# Patient Record
Sex: MALE | Race: BLACK OR AFRICAN AMERICAN
[De-identification: names, ages, dates, MRNs, and addresses within clinical notes are randomized per-mention and may not be internally consistent; named-entity substitution may affect disease eponyms.]

---

## 2021-02-08 ENCOUNTER — HOSPITAL ENCOUNTER (EMERGENCY)
Dept: HOSPITAL 57 - BURERS | Age: 41
Discharge: LEFT BEFORE DISCHARGE | End: 2021-02-08
Payer: SELF-PAY

## 2021-02-08 DIAGNOSIS — F17.210: ICD-10-CM

## 2021-02-08 DIAGNOSIS — R77.8: ICD-10-CM

## 2021-02-08 DIAGNOSIS — I10: Primary | ICD-10-CM

## 2021-02-08 LAB
ALBUMIN SERPL BCG-MCNC: 4.4 G/DL (ref 3.5–5)
ALP SERPL-CCNC: 66 U/L (ref 40–110)
ALT SERPL W P-5'-P-CCNC: 17 U/L (ref 8–55)
ANION GAP SERPL CALC-SCNC: 24 MMOL/L (ref 10–20)
ANISOCYTOSIS BLD QL SMEAR: (no result) (100X)
AST SERPL-CCNC: 13 U/L (ref 5–34)
BASOPHILS # BLD AUTO: 0.1 THOU/UL (ref 0–0.2)
BASOPHILS NFR BLD AUTO: 1.3 % (ref 0–1)
BILIRUB SERPL-MCNC: 0.6 MG/DL (ref 0.2–1.2)
BUN SERPL-MCNC: 74 MG/DL (ref 8.9–20.6)
CALCIUM SERPL-MCNC: 9.9 MG/DL (ref 7.8–10.44)
CHLORIDE SERPL-SCNC: 98 MMOL/L (ref 98–107)
CK MB SERPL-MCNC: 5.4 NG/ML (ref 0–6.6)
CO2 SERPL-SCNC: 26 MMOL/L (ref 22–29)
CREAT CL PREDICTED SERPL C-G-VRATE: 0 ML/MIN (ref 70–130)
EOSINOPHIL # BLD AUTO: 0.7 THOU/UL (ref 0–0.7)
EOSINOPHIL NFR BLD AUTO: 8.8 % (ref 0–10)
GLOBULIN SER CALC-MCNC: 4 G/DL (ref 2.4–3.5)
GLUCOSE SERPL-MCNC: 83 MG/DL (ref 70–105)
HGB BLD-MCNC: 9.7 G/DL (ref 14–18)
LYMPHOCYTES # BLD AUTO: 0.9 THOU/UL (ref 1.2–3.4)
LYMPHOCYTES NFR BLD AUTO: 11.2 % (ref 21–51)
MCH RBC QN AUTO: 28.9 PG (ref 27–31)
MCV RBC AUTO: 96.3 FL (ref 78–98)
MDIFF COMPLETE?: YES
MONOCYTES # BLD AUTO: 0.4 THOU/UL (ref 0.11–0.59)
MONOCYTES NFR BLD AUTO: 5.2 % (ref 0–10)
NEUTROPHILS # BLD AUTO: 6.1 THOU/UL (ref 1.4–6.5)
NEUTROPHILS NFR BLD AUTO: 73.5 % (ref 42–75)
OVALOCYTES BLD QL SMEAR: (no result) (100X)
PLATELET # BLD AUTO: 187 THOU/UL (ref 130–400)
POTASSIUM SERPL-SCNC: 5 MMOL/L (ref 3.5–5.1)
RBC # BLD AUTO: 3.36 MILL/UL (ref 4.7–6.1)
SODIUM SERPL-SCNC: 143 MMOL/L (ref 136–145)
WBC # BLD AUTO: 8.3 THOU/UL (ref 4.8–10.8)

## 2021-02-08 PROCEDURE — 71045 X-RAY EXAM CHEST 1 VIEW: CPT

## 2021-02-08 PROCEDURE — 85025 COMPLETE CBC W/AUTO DIFF WBC: CPT

## 2021-02-08 PROCEDURE — 82553 CREATINE MB FRACTION: CPT

## 2021-02-08 PROCEDURE — 94760 N-INVAS EAR/PLS OXIMETRY 1: CPT

## 2021-02-08 PROCEDURE — 96374 THER/PROPH/DIAG INJ IV PUSH: CPT

## 2021-02-08 PROCEDURE — 84484 ASSAY OF TROPONIN QUANT: CPT

## 2021-02-08 PROCEDURE — 93005 ELECTROCARDIOGRAM TRACING: CPT

## 2021-02-08 PROCEDURE — 80053 COMPREHEN METABOLIC PANEL: CPT

## 2021-02-08 NOTE — RAD
PORTABLE CHEST: 

 

Date: 2-8-2021

 

An AP portable film at 1539 is compared with an 11-21-18 study. 

 

FINDINGS: 

The heart is mildly enlarged, perhaps a little more so than before. Nevertheless, there are no conges
tive findings or large pleural effusions. Slight blunting of the left costophrenic angle may likely b
e related to positioning of the patient. There is no vascular congestion or edema. Slight haziness ov
er the right lower chest is felt to be due to positioning as well. 

 

IMPRESSION: 

Mild cardiomegaly but no acute findings otherwise. 

 

POS: HOME

## 2021-06-18 ENCOUNTER — HOSPITAL ENCOUNTER (INPATIENT)
Dept: HOSPITAL 92 - ERS | Age: 41
LOS: 4 days | Discharge: LEFT BEFORE DISCHARGE | DRG: 280 | End: 2021-06-22
Attending: INTERNAL MEDICINE | Admitting: INTERNAL MEDICINE
Payer: MEDICARE

## 2021-06-18 VITALS — BODY MASS INDEX: 24.2 KG/M2

## 2021-06-18 DIAGNOSIS — K82.8: ICD-10-CM

## 2021-06-18 DIAGNOSIS — N18.6: ICD-10-CM

## 2021-06-18 DIAGNOSIS — I50.32: ICD-10-CM

## 2021-06-18 DIAGNOSIS — E87.6: ICD-10-CM

## 2021-06-18 DIAGNOSIS — Z20.822: ICD-10-CM

## 2021-06-18 DIAGNOSIS — K59.09: ICD-10-CM

## 2021-06-18 DIAGNOSIS — I13.2: ICD-10-CM

## 2021-06-18 DIAGNOSIS — I21.A1: ICD-10-CM

## 2021-06-18 DIAGNOSIS — D63.1: ICD-10-CM

## 2021-06-18 DIAGNOSIS — Z99.2: ICD-10-CM

## 2021-06-18 DIAGNOSIS — M47.816: ICD-10-CM

## 2021-06-18 DIAGNOSIS — Z79.899: ICD-10-CM

## 2021-06-18 DIAGNOSIS — I70.1: ICD-10-CM

## 2021-06-18 DIAGNOSIS — I31.3: ICD-10-CM

## 2021-06-18 DIAGNOSIS — I16.0: Primary | ICD-10-CM

## 2021-06-18 DIAGNOSIS — F17.210: ICD-10-CM

## 2021-06-18 DIAGNOSIS — E85.9: ICD-10-CM

## 2021-06-18 LAB
ALBUMIN SERPL BCG-MCNC: 4 G/DL (ref 3.5–5)
ALP SERPL-CCNC: 51 U/L (ref 40–110)
ALT SERPL W P-5'-P-CCNC: (no result) U/L (ref 8–55)
ANION GAP SERPL CALC-SCNC: 14 MMOL/L (ref 10–20)
AST SERPL-CCNC: 9 U/L (ref 5–34)
BASOPHILS # BLD AUTO: 0.1 THOU/UL (ref 0–0.2)
BASOPHILS NFR BLD AUTO: 1.1 % (ref 0–1)
BILIRUB SERPL-MCNC: 0.7 MG/DL (ref 0.2–1.2)
BUN SERPL-MCNC: 15 MG/DL (ref 8.9–20.6)
CALCIUM SERPL-MCNC: 9.9 MG/DL (ref 7.8–10.44)
CHLORIDE SERPL-SCNC: 96 MMOL/L (ref 98–107)
CK MB SERPL-MCNC: 2.2 NG/ML (ref 0–6.6)
CK SERPL-CCNC: 125 U/L (ref 30–200)
CO2 SERPL-SCNC: 32 MMOL/L (ref 22–29)
CREAT CL PREDICTED SERPL C-G-VRATE: 0 ML/MIN (ref 70–130)
EOSINOPHIL # BLD AUTO: 0.5 THOU/UL (ref 0–0.7)
EOSINOPHIL NFR BLD AUTO: 6.3 % (ref 0–10)
GLOBULIN SER CALC-MCNC: 3.6 G/DL (ref 2.4–3.5)
GLUCOSE SERPL-MCNC: 92 MG/DL (ref 70–105)
HGB BLD-MCNC: 12.4 G/DL (ref 14–18)
LIPASE SERPL-CCNC: 25 U/L (ref 8–78)
LYMPHOCYTES # BLD: 0.9 THOU/UL (ref 1.2–3.4)
LYMPHOCYTES NFR BLD AUTO: 12 % (ref 21–51)
MCH RBC QN AUTO: 28.1 PG (ref 27–31)
MCV RBC AUTO: 86.8 FL (ref 78–98)
MONOCYTES # BLD AUTO: 0.7 THOU/UL (ref 0.11–0.59)
MONOCYTES NFR BLD AUTO: 8.7 % (ref 0–10)
NEUTROPHILS # BLD AUTO: 5.5 THOU/UL (ref 1.4–6.5)
NEUTROPHILS NFR BLD AUTO: 71.9 % (ref 42–75)
PLATELET # BLD AUTO: 188 THOU/UL (ref 130–400)
POTASSIUM SERPL-SCNC: 3.3 MMOL/L (ref 3.5–5.1)
RBC # BLD AUTO: 4.42 MILL/UL (ref 4.7–6.1)
SODIUM SERPL-SCNC: 139 MMOL/L (ref 136–145)
WBC # BLD AUTO: 7.7 THOU/UL (ref 4.8–10.8)

## 2021-06-18 PROCEDURE — 85025 COMPLETE CBC W/AUTO DIFF WBC: CPT

## 2021-06-18 PROCEDURE — 80053 COMPREHEN METABOLIC PANEL: CPT

## 2021-06-18 PROCEDURE — 71045 X-RAY EXAM CHEST 1 VIEW: CPT

## 2021-06-18 PROCEDURE — U0005 INFEC AGEN DETEC AMPLI PROBE: HCPCS

## 2021-06-18 PROCEDURE — 82553 CREATINE MB FRACTION: CPT

## 2021-06-18 PROCEDURE — 80048 BASIC METABOLIC PNL TOTAL CA: CPT

## 2021-06-18 PROCEDURE — 71275 CT ANGIOGRAPHY CHEST: CPT

## 2021-06-18 PROCEDURE — 96374 THER/PROPH/DIAG INJ IV PUSH: CPT

## 2021-06-18 PROCEDURE — 86803 HEPATITIS C AB TEST: CPT

## 2021-06-18 PROCEDURE — 96375 TX/PRO/DX INJ NEW DRUG ADDON: CPT

## 2021-06-18 PROCEDURE — 74176 CT ABD & PELVIS W/O CONTRAST: CPT

## 2021-06-18 PROCEDURE — U0003 INFECTIOUS AGENT DETECTION BY NUCLEIC ACID (DNA OR RNA); SEVERE ACUTE RESPIRATORY SYNDROME CORONAVIRUS 2 (SARS-COV-2) (CORONAVIRUS DISEASE [COVID-19]), AMPLIFIED PROBE TECHNIQUE, MAKING USE OF HIGH THROUGHPUT TECHNOLOGIES AS DESCRIBED BY CMS-2020-01-R: HCPCS

## 2021-06-18 PROCEDURE — 83690 ASSAY OF LIPASE: CPT

## 2021-06-18 PROCEDURE — 85007 BL SMEAR W/DIFF WBC COUNT: CPT

## 2021-06-18 PROCEDURE — 84484 ASSAY OF TROPONIN QUANT: CPT

## 2021-06-18 PROCEDURE — 90935 HEMODIALYSIS ONE EVALUATION: CPT

## 2021-06-18 PROCEDURE — A9537 TC99M MEBROFENIN: HCPCS

## 2021-06-18 PROCEDURE — 86706 HEP B SURFACE ANTIBODY: CPT

## 2021-06-18 PROCEDURE — 86704 HEP B CORE ANTIBODY TOTAL: CPT

## 2021-06-18 PROCEDURE — G0257 UNSCHED DIALYSIS ESRD PT HOS: HCPCS

## 2021-06-18 PROCEDURE — 93975 VASCULAR STUDY: CPT

## 2021-06-18 PROCEDURE — 82550 ASSAY OF CK (CPK): CPT

## 2021-06-18 PROCEDURE — 80061 LIPID PANEL: CPT

## 2021-06-18 PROCEDURE — 93005 ELECTROCARDIOGRAM TRACING: CPT

## 2021-06-18 PROCEDURE — 36415 COLL VENOUS BLD VENIPUNCTURE: CPT

## 2021-06-18 PROCEDURE — 74174 CTA ABD&PLVS W/CONTRAST: CPT

## 2021-06-18 PROCEDURE — 93306 TTE W/DOPPLER COMPLETE: CPT

## 2021-06-18 PROCEDURE — 87340 HEPATITIS B SURFACE AG IA: CPT

## 2021-06-18 PROCEDURE — 78227 HEPATOBIL SYST IMAGE W/DRUG: CPT

## 2021-06-18 PROCEDURE — 85027 COMPLETE CBC AUTOMATED: CPT

## 2021-06-19 LAB
ANION GAP SERPL CALC-SCNC: 19 MMOL/L (ref 10–20)
BUN SERPL-MCNC: 21 MG/DL (ref 8.9–20.6)
CALCIUM SERPL-MCNC: 9.6 MG/DL (ref 7.8–10.44)
CHLORIDE SERPL-SCNC: 96 MMOL/L (ref 98–107)
CK MB SERPL-MCNC: 2.3 NG/ML (ref 0–6.6)
CO2 SERPL-SCNC: 28 MMOL/L (ref 22–29)
CREAT CL PREDICTED SERPL C-G-VRATE: 11 ML/MIN (ref 70–130)
GLUCOSE SERPL-MCNC: 82 MG/DL (ref 70–105)
POTASSIUM SERPL-SCNC: 3.6 MMOL/L (ref 3.5–5.1)
SODIUM SERPL-SCNC: 139 MMOL/L (ref 136–145)
TROPONIN I SERPL DL<=0.01 NG/ML-MCNC: 0.27 NG/ML (ref ?–0.03)

## 2021-06-19 RX ADMIN — Medication SCH ML: at 20:44

## 2021-06-19 RX ADMIN — Medication SCH ML: at 07:56

## 2021-06-20 LAB
CHD RISK SERPL-RTO: 4.3 (ref ?–4.5)
CHOLEST SERPL-MCNC: 138 MG/DL
HBSAG INDEX: 0.17 S/CO (ref 0–0.99)
HBV SURFACE AB SERPL IA-ACNC: 1992.36 MIU/ML
HDLC SERPL-MCNC: 32 MG/DL
HEP B CORE TOTAL INDEX: 0.07 S/CO (ref 0–0.79)
HEP C INDEX: 0.09 S/CO (ref 0–0.79)
LDLC SERPL CALC-MCNC: 91 MG/DL
TRIGL SERPL-MCNC: 76 MG/DL (ref ?–150)

## 2021-06-20 RX ADMIN — Medication SCH ML: at 21:18

## 2021-06-20 RX ADMIN — Medication SCH ML: at 09:02

## 2021-06-21 LAB
HGB BLD-MCNC: 11.7 G/DL (ref 14–18)
MCH RBC QN AUTO: 28.3 PG (ref 27–31)
MCV RBC AUTO: 87 FL (ref 78–98)
MDIFF COMPLETE?: YES
PLATELET # BLD AUTO: 143 THOU/UL (ref 130–400)
RBC # BLD AUTO: 4.11 MILL/UL (ref 4.7–6.1)
WBC # BLD AUTO: 7.8 THOU/UL (ref 4.8–10.8)

## 2021-06-21 PROCEDURE — 5A1D70Z PERFORMANCE OF URINARY FILTRATION, INTERMITTENT, LESS THAN 6 HOURS PER DAY: ICD-10-PCS | Performed by: INTERNAL MEDICINE

## 2021-06-21 RX ADMIN — Medication SCH: at 12:07

## 2021-06-21 RX ADMIN — Medication SCH ML: at 20:37

## 2021-06-22 VITALS — DIASTOLIC BLOOD PRESSURE: 92 MMHG | SYSTOLIC BLOOD PRESSURE: 165 MMHG

## 2021-06-22 VITALS — TEMPERATURE: 98.4 F

## 2021-06-22 RX ADMIN — Medication SCH ML: at 10:43
